# Patient Record
Sex: MALE | URBAN - METROPOLITAN AREA
[De-identification: names, ages, dates, MRNs, and addresses within clinical notes are randomized per-mention and may not be internally consistent; named-entity substitution may affect disease eponyms.]

---

## 2022-02-26 ENCOUNTER — NURSE TRIAGE (OUTPATIENT)
Dept: NURSING | Facility: CLINIC | Age: 22
End: 2022-02-26

## 2022-02-26 NOTE — TELEPHONE ENCOUNTER
TRIAGE CALL     Patient calling COVID 7 days ago  Symptoms has been much better.  Pt has been felling Pulse going stronger, pt felt wired  No headaches or dizziness   RN asked to take pulse (gave technique) P: 84 and has been around that for the last 24 hrs  Denies sodas or coffee pt pt is a student and has trouble sleeping.  Denies taking any medication.  RN discussed with pt stress and anxiety with school  Advised pt to ask for resources at school to managed them  Symptoms/concern started yesterday    Pain 0/10    Numbness or tingling none   Medications taken None   Denies difficulty with breathing, chest pain, fever, vomiting, or diarrhea.   Patient/Child able to Speak in full long sentences without getting short of breath.  Pt s PCP/Clinic:[facility]    Per protocol, disposition to home care     [place] advised if he still concern  Care advise given and caller s questions were answered  Reminded we will be here 24/7 and can call back and ask to speak with a nurse.     Patient verbalized understanding and agrees with plan    Delphine Greenwood RN Nurse Triage Advisor 11:34 AM 2/26/2022    Reason for Disposition    [1] PERSISTING SYMPTOMS OF COVID-19 AND [2] symptoms BETTER (improving)    Additional Information    Negative: SEVERE difficulty breathing (e.g., struggling for each breath, speaks in single words)    Negative: [1] SEVERE weakness (e.g., can't stand or can barely walk) AND [2] new-onset or WORSE    Negative: Difficult to awaken or acting confused (e.g., disoriented, slurred speech)    Negative: Bluish (or gray) lips or face now    Negative: Sounds like a life-threatening emergency to the triager    Negative: [1] Typical COVID-19 symptoms AND [2] lasting less than 3 weeks    Negative: [1] Chest pain, pressure, or tightness AND [2] new-onset or worsening    Negative: [1] Fever AND [2] new-onset or worsening    Negative: [1] MODERATE difficulty breathing (e.g., speaks in phrases, SOB even at rest, pulse 100-120) AND  [2] new-onset or WORSE    Negative: [1] MODERATE difficulty breathing AND [2] oxygen level (e.g., pulse oximetry) 91 to 94 percent    Negative: Oxygen level (e.g., pulse oximetry) 90 percent or lower    Negative: MODERATE difficulty breathing (e.g., speaks in phrases, SOB even at rest, pulse 100-120)    Negative: [1] Drinking very little AND [2] dehydration suspected (e.g., no urine > 12 hours, very dry mouth, very lightheaded)    Negative: Patient sounds very sick or weak to the triager    Negative: [1] MILD difficulty breathing (e.g., minimal/no SOB at rest, SOB with walking, pulse <100) AND [2] new-onset    Negative: Oxygen level (e.g., pulse oximetry) 91 to 94 percent    Negative: [1] PERSISTING SYMPTOMS OF COVID-19 AND [2] NEW symptom AND [3] could be serious    Negative: [1] Caller has URGENT question AND [2] triager unable to answer question    Negative: [1] PERSISTING SYMPTOMS OF COVID-19 AND [2] symptoms WORSE    Negative: [1] Caller has NON-URGENT question AND [2] triager unable to answer    Negative: [1] PERSISTING SYMPTOMS OF COVID-19 AND [2] NO medical visit for COVID-19 in past 2 weeks    Negative: [1] PERSISTING SYMPTOMS OF COVID-19 AND [2] symptoms SAME AND [3] medical visit for COVID-19 in past 2 weeks    Protocols used: CORONAVIRUS (COVID-19) PERSISTING SYMPTOMS FOLLOW-UP CALL-A- 8.25.2021